# Patient Record
Sex: FEMALE | Race: WHITE
[De-identification: names, ages, dates, MRNs, and addresses within clinical notes are randomized per-mention and may not be internally consistent; named-entity substitution may affect disease eponyms.]

---

## 2019-06-12 ENCOUNTER — HOSPITAL ENCOUNTER (OUTPATIENT)
Dept: HOSPITAL 92 - RAD | Age: 49
Discharge: HOME | End: 2019-06-12
Attending: SURGERY
Payer: COMMERCIAL

## 2019-06-12 DIAGNOSIS — Z98.84: ICD-10-CM

## 2019-06-12 DIAGNOSIS — K21.9: Primary | ICD-10-CM

## 2019-06-12 PROCEDURE — 74220 X-RAY XM ESOPHAGUS 1CNTRST: CPT

## 2019-06-12 NOTE — RAD
Esophagram



HISTORY: Dysphagia. Prior bariatric surgery.



FINDINGS: There is a lap band at the GE junction is in good radiographic position.



Single column barium evaluation shows normal primary and secondary peristalsis of the stomach. No elsie
dence of obstruction. GE junction is widely patent. A small amount of gastroesophageal reflux was

demonstrated with patient supine.



Fluoroscopy time 0.9 minutes.







IMPRESSION: Lap band in place. No evidence of acute complication or obstruction.



Small amount of gastroesophageal reflux.



Reported By: CHARITO Limon 

Electronically Signed:  6/12/2019 9:39 AM

## 2019-06-24 ENCOUNTER — HOSPITAL ENCOUNTER (OUTPATIENT)
Dept: HOSPITAL 92 - DTY/OP | Age: 49
Discharge: HOME | End: 2019-06-24
Attending: SURGERY
Payer: COMMERCIAL

## 2019-06-24 DIAGNOSIS — E66.01: Primary | ICD-10-CM

## 2019-06-24 PROCEDURE — 97802 MEDICAL NUTRITION INDIV IN: CPT

## 2019-07-19 ENCOUNTER — HOSPITAL ENCOUNTER (INPATIENT)
Dept: HOSPITAL 92 - SURG A | Age: 49
LOS: 6 days | Discharge: HOME | DRG: 621 | End: 2019-07-25
Attending: SURGERY | Admitting: SURGERY
Payer: COMMERCIAL

## 2019-07-19 ENCOUNTER — HOSPITAL ENCOUNTER (OUTPATIENT)
Dept: HOSPITAL 92 - LABBT | Age: 49
Discharge: HOME | End: 2019-07-19
Attending: SURGERY
Payer: COMMERCIAL

## 2019-07-19 VITALS — BODY MASS INDEX: 33.7 KG/M2

## 2019-07-19 DIAGNOSIS — E66.01: Primary | ICD-10-CM

## 2019-07-19 DIAGNOSIS — Z87.891: ICD-10-CM

## 2019-07-19 DIAGNOSIS — K44.9: ICD-10-CM

## 2019-07-19 DIAGNOSIS — E66.01: ICD-10-CM

## 2019-07-19 DIAGNOSIS — Z01.818: Primary | ICD-10-CM

## 2019-07-19 LAB
ALBUMIN SERPL BCG-MCNC: 4.4 G/DL (ref 3.5–5)
ALP SERPL-CCNC: 69 U/L (ref 40–150)
ALT SERPL W P-5'-P-CCNC: 19 U/L (ref 8–55)
ANION GAP SERPL CALC-SCNC: 7 MMOL/L (ref 10–20)
AST SERPL-CCNC: 20 U/L (ref 5–34)
BASOPHILS # BLD AUTO: 0.1 THOU/UL (ref 0–0.2)
BASOPHILS NFR BLD AUTO: 0.8 % (ref 0–1)
BILIRUB DIRECT SERPL-MCNC: 0.2 MG/DL (ref 0.1–0.3)
BILIRUB SERPL-MCNC: 0.5 MG/DL (ref 0.2–1.2)
BUN SERPL-MCNC: 25 MG/DL (ref 7–18.7)
CALCIUM SERPL-MCNC: 9.3 MG/DL (ref 7.8–10.44)
CHLORIDE SERPL-SCNC: 100 MMOL/L (ref 98–107)
CO2 SERPL-SCNC: 25 MMOL/L (ref 22–29)
CREAT CL PREDICTED SERPL C-G-VRATE: 0 ML/MIN (ref 70–130)
EOSINOPHIL # BLD AUTO: 0.2 THOU/UL (ref 0–0.7)
EOSINOPHIL NFR BLD AUTO: 2.1 % (ref 0–10)
GLOBULIN SER CALC-MCNC: 2.8 G/DL (ref 2.4–3.5)
GLUCOSE SERPL-MCNC: 85 MG/DL (ref 70–105)
HGB BLD-MCNC: 14.1 G/DL (ref 12–16)
LYMPHOCYTES # BLD: 3.3 THOU/UL (ref 1.2–3.4)
LYMPHOCYTES NFR BLD AUTO: 39.3 % (ref 21–51)
MCH RBC QN AUTO: 32.2 PG (ref 27–31)
MCV RBC AUTO: 97.3 FL (ref 78–98)
MONOCYTES # BLD AUTO: 0.7 THOU/UL (ref 0.11–0.59)
MONOCYTES NFR BLD AUTO: 7.8 % (ref 0–10)
NEUTROPHILS # BLD AUTO: 4.2 THOU/UL (ref 1.4–6.5)
NEUTROPHILS NFR BLD AUTO: 49.9 % (ref 42–75)
PLATELET # BLD AUTO: 325 THOU/UL (ref 130–400)
POTASSIUM SERPL-SCNC: 3.6 MMOL/L (ref 3.5–5.1)
PREGS CONTROL BACKGROUND?: (no result)
PREGS CONTROL BAR APPEAR?: YES
RBC # BLD AUTO: 4.37 MILL/UL (ref 4.2–5.4)
SODIUM SERPL-SCNC: 128 MMOL/L (ref 136–145)
WBC # BLD AUTO: 8.3 THOU/UL (ref 4.8–10.8)

## 2019-07-19 PROCEDURE — 80048 BASIC METABOLIC PNL TOTAL CA: CPT

## 2019-07-19 PROCEDURE — 83036 HEMOGLOBIN GLYCOSYLATED A1C: CPT

## 2019-07-19 PROCEDURE — 88307 TISSUE EXAM BY PATHOLOGIST: CPT

## 2019-07-19 PROCEDURE — C9113 INJ PANTOPRAZOLE SODIUM, VIA: HCPCS

## 2019-07-19 PROCEDURE — 93005 ELECTROCARDIOGRAM TRACING: CPT

## 2019-07-19 PROCEDURE — 88312 SPECIAL STAINS GROUP 1: CPT

## 2019-07-19 PROCEDURE — 36415 COLL VENOUS BLD VENIPUNCTURE: CPT

## 2019-07-19 PROCEDURE — 94760 N-INVAS EAR/PLS OXIMETRY 1: CPT

## 2019-07-19 PROCEDURE — 85025 COMPLETE CBC W/AUTO DIFF WBC: CPT

## 2019-07-19 PROCEDURE — 80053 COMPREHEN METABOLIC PANEL: CPT

## 2019-07-19 PROCEDURE — 93010 ELECTROCARDIOGRAM REPORT: CPT

## 2019-07-19 PROCEDURE — 71046 X-RAY EXAM CHEST 2 VIEWS: CPT

## 2019-07-19 PROCEDURE — 84703 CHORIONIC GONADOTROPIN ASSAY: CPT

## 2019-07-19 PROCEDURE — 80076 HEPATIC FUNCTION PANEL: CPT

## 2019-07-19 PROCEDURE — 74241: CPT

## 2019-07-19 NOTE — RAD
XR Chest Pa   Lat STANDARD



HISTORY: Preop



COMPARISON: None.



FINDINGS: Heart size and mediastinum are within normal limits. The lungs are clear of infiltrates. A 
gastric band is incidentally seen. There are arthritic changes of the spine.



IMPRESSION: No active intrathoracic disease.



Reported By: Sb Couch 

Electronically Signed:  7/19/2019 4:34 PM

## 2019-07-24 PROCEDURE — 0DP64CZ REMOVAL OF EXTRALUMINAL DEVICE FROM STOMACH, PERCUTANEOUS ENDOSCOPIC APPROACH: ICD-10-PCS | Performed by: SURGERY

## 2019-07-24 PROCEDURE — 0BQT4ZZ REPAIR DIAPHRAGM, PERCUTANEOUS ENDOSCOPIC APPROACH: ICD-10-PCS | Performed by: SURGERY

## 2019-07-24 PROCEDURE — 0DB64Z3 EXCISION OF STOMACH, PERCUTANEOUS ENDOSCOPIC APPROACH, VERTICAL: ICD-10-PCS | Performed by: SURGERY

## 2019-07-24 RX ADMIN — POTASSIUM CHLORIDE, DEXTROSE MONOHYDRATE AND SODIUM CHLORIDE SCH MLS: 150; 5; 450 INJECTION, SOLUTION INTRAVENOUS at 21:24

## 2019-07-24 RX ADMIN — POTASSIUM CHLORIDE, DEXTROSE MONOHYDRATE AND SODIUM CHLORIDE SCH: 150; 5; 450 INJECTION, SOLUTION INTRAVENOUS at 18:39

## 2019-07-24 RX ADMIN — POTASSIUM CHLORIDE, DEXTROSE MONOHYDRATE AND SODIUM CHLORIDE SCH: 150; 5; 450 INJECTION, SOLUTION INTRAVENOUS at 15:39

## 2019-07-24 NOTE — OP
DATE OF PROCEDURE:  07/24/2019



PREOPERATIVE DIAGNOSES:  Morbid obesity, lap band intolerance, hiatal hernia.



PROCEDURE PERFORMED:  Laparoscopic removal of lap band and port, hiatal hernia

repair, sleeve gastrectomy, and esophagogastroduodenoscopy. 



INDICATIONS:  The patient is a 49-year-old female, who had a lap band placed in

about 2009 and has been intolerant of.  She cannot tolerate any fluid restriction.

She gets excessive nausea and vomiting reflux.  As such, she has regained her

weight. 



FINDINGS:  She had a moderate-sized hiatal hernia.  There was quite a bit of

adhesions.  A 38-Filipino bougie used. 



DESCRIPTION OF PROCEDURE:  After informed consent was obtained, the patient was

taken to the operating room, given general endotracheal anesthesia.  She was placed

in the supine position.  Abdomen was prepped and draped in usual fashion.  Local

anesthesia was infiltrated subcutaneously and deep.  A 12-mm incision was performed

8 inches above the xiphoid slightly to the left.  A Veress needle was inserted.

Drop test was performed.  Pneumoperitoneum was created to a volume of 2 L of carbon

dioxide.  Using a bladeless 12-mm trocar and 0-degree laparoscope, direct visual

entry into the abdominal cavity was performed.  Pneumoperitoneum was then created to

a pressure of 15 mmHg and the patient placed in steep reverse Trendelenburg

position.  A Rei liver retractor was inserted.  Left lobe of liver retracted

superiorly.  The pylorus was identified.  A 12-mm port placed on the right beneath

it and two 12s placed left subcostal, one of which was where the port was.  The lap

band tubing was identified and divided sharply.  Then, the tubing was tracked down

to the buckle and dissected out using blunt and sharp dissection.  The capsule was

incised sharply with the LigaSure circumferentially and the lap band removed from

around the stomach.  The lap band was then actually removed from the abdomen.  Then,

the omentum was taken off the greater curvature 5 cm from the pylorus utilizing the

LigaSure.  Short gastrics divided with the LigaSure and left crura defined with the

LigaSure.  She was noted to have a hiatal hernia, so the right crura was also

defined and a subesophageal plane was developed to expose the posterior crural.  A

38-Filipino bougie was inserted and a posterior crural plication was performed using 0

Ethibond in the Sew-Right and Ti-Knot device.  Then, the bougie was directed into

the antrum and the linear 60 mm green load stapler used to divide the antrum to the

bougie, gold load along the bougie, and a series of blue, then another gold near

where the band had been and finally another blue load.  Intraoperative endoscopy was

then performed.  The video endoscope was inserted under direct vision and advanced

into the sleeve.  The staple line was inspected.  There was no bleeding.  Staple

line was then tested by inflating the new stomach with pressurized air under water,

there was no air leak.  Stomach was decompressed.  Scope was removed.  At this

point, the lap band port was dissected out, removed.  Then, the remnant stomach was

removed from the abdomen through this lap band port site and sent to Pathology for

further analysis.  The fascia was closed with 0 Vicryl suture and the GraNee needle.

 The wounds were thoroughly irrigated.  Hemostasis was assured.  Trocars and

retractors were removed.  Skin was closed with interrupted 4-0 Rapide.  Dermabond

applied.  The patient tolerated the procedure well, transferred to Recovery in good

condition.  Sponge and needle count verified correct x2. 







Job ID:  [id number]

## 2019-07-25 VITALS — DIASTOLIC BLOOD PRESSURE: 74 MMHG | TEMPERATURE: 98.1 F | SYSTOLIC BLOOD PRESSURE: 109 MMHG

## 2019-07-25 LAB
ANION GAP SERPL CALC-SCNC: 10 MMOL/L (ref 10–20)
BASOPHILS # BLD AUTO: 0 THOU/UL (ref 0–0.2)
BASOPHILS NFR BLD AUTO: 0.1 % (ref 0–1)
BUN SERPL-MCNC: 10 MG/DL (ref 7–18.7)
CALCIUM SERPL-MCNC: 9.1 MG/DL (ref 7.8–10.44)
CHLORIDE SERPL-SCNC: 105 MMOL/L (ref 98–107)
CO2 SERPL-SCNC: 26 MMOL/L (ref 22–29)
CREAT CL PREDICTED SERPL C-G-VRATE: 118 ML/MIN (ref 70–130)
EOSINOPHIL # BLD AUTO: 0 THOU/UL (ref 0–0.7)
EOSINOPHIL NFR BLD AUTO: 0.1 % (ref 0–10)
GLUCOSE SERPL-MCNC: 146 MG/DL (ref 70–105)
HGB BLD-MCNC: 11.9 G/DL (ref 12–16)
LYMPHOCYTES # BLD: 1.5 THOU/UL (ref 1.2–3.4)
LYMPHOCYTES NFR BLD AUTO: 10.5 % (ref 21–51)
MCH RBC QN AUTO: 32.9 PG (ref 27–31)
MCV RBC AUTO: 97.9 FL (ref 78–98)
MONOCYTES # BLD AUTO: 1.2 THOU/UL (ref 0.11–0.59)
MONOCYTES NFR BLD AUTO: 8.2 % (ref 0–10)
NEUTROPHILS # BLD AUTO: 11.7 THOU/UL (ref 1.4–6.5)
NEUTROPHILS NFR BLD AUTO: 81.2 % (ref 42–75)
PLATELET # BLD AUTO: 277 THOU/UL (ref 130–400)
POTASSIUM SERPL-SCNC: 5 MMOL/L (ref 3.5–5.1)
RBC # BLD AUTO: 3.63 MILL/UL (ref 4.2–5.4)
SODIUM SERPL-SCNC: 136 MMOL/L (ref 136–145)
WBC # BLD AUTO: 14.3 THOU/UL (ref 4.8–10.8)

## 2019-07-25 RX ADMIN — POTASSIUM CHLORIDE, DEXTROSE MONOHYDRATE AND SODIUM CHLORIDE SCH MLS: 150; 5; 450 INJECTION, SOLUTION INTRAVENOUS at 05:49

## 2019-07-25 NOTE — RAD
EXAM:

XR UGI Single Contrast No Air



PROVIDED CLINICAL HISTORY:

Patient is post to recent removal of the gastric band and gastric sleeve procedure with repair of hia
alycia hernia.



COMPARISON:

6/12/2019



FINDINGS:

Approximately 15 mL of Gastrografin was administered by mouth. There is significant holdup of contras
t seen within the region of the GE junction. A small amount of contrast extends into the stomach

and subsequently into the small bowel after 25 minutes with persistent contrast seen above the level 
of the GE junction. There is no extravasation of contrast seen to suggest a leak. Multiple surgical

clips are seen in the epigastric region. Gastric band seen on prior study on 6/12/2019 is no longer v
isualized on this exam.



IMPRESSION:

Post surgical changes related to gastric sleeve procedure. There is significant holdup of contrast pr
oximal to the level of the GE junction which may be related to edema in this region secondary to

recent surgery. There is no extravasation of contrast seen to suggest a leak. Findings were discussed
 with Dr. Ba on 7/25/2019 at 0908 hours.



Reported By: Olvin Gallo 

Electronically Signed:  7/25/2019 9:10 AM

## 2019-07-26 NOTE — DIS
DATE OF ADMISSION:  07/24/2019



DATE OF DISCHARGE:  07/25/2019



DISCHARGE DIAGNOSES:  Lap band intolerance, morbid obesity, hiatal hernia.



HOSPITAL COURSE:  The patient was admitted, taken to the operating room where she

underwent a sleeve gastrectomy, removal of band and port, and repair of hiatal

hernia.  Postoperatively, she has done well.  Her swallow showed quite a bit of

edema, but she is tolerating liquids well.  Pain is controlled on p.o. medications.

She is discharged home on hydrocodone and Zofran.  She will follow up with me in 2

weeks. 







Job ID:  [id number]

## 2023-10-23 ENCOUNTER — HOSPITAL ENCOUNTER (OUTPATIENT)
Dept: HOSPITAL 92 - BICRAD | Age: 53
Discharge: HOME | End: 2023-10-23
Payer: COMMERCIAL

## 2023-10-23 DIAGNOSIS — M19.042: Primary | ICD-10-CM

## 2023-10-23 DIAGNOSIS — M19.041: ICD-10-CM
